# Patient Record
Sex: FEMALE | Race: WHITE | NOT HISPANIC OR LATINO | URBAN - METROPOLITAN AREA
[De-identification: names, ages, dates, MRNs, and addresses within clinical notes are randomized per-mention and may not be internally consistent; named-entity substitution may affect disease eponyms.]

---

## 2020-06-10 ENCOUNTER — INPATIENT (INPATIENT)
Facility: HOSPITAL | Age: 75
LOS: 0 days | Discharge: HOME HEALTH | End: 2020-06-11
Attending: SURGERY | Admitting: SURGERY
Payer: MEDICARE

## 2020-06-10 VITALS
HEART RATE: 104 BPM | DIASTOLIC BLOOD PRESSURE: 72 MMHG | HEIGHT: 64 IN | SYSTOLIC BLOOD PRESSURE: 160 MMHG | WEIGHT: 139.99 LBS | TEMPERATURE: 99 F | RESPIRATION RATE: 18 BRPM | OXYGEN SATURATION: 90 %
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
## 2020-06-10 LAB
ALBUMIN SERPL ELPH-MCNC: 4.1 G/DL — SIGNIFICANT CHANGE UP (ref 3.5–5.2)
ALP SERPL-CCNC: 94 U/L — SIGNIFICANT CHANGE UP (ref 30–115)
ALT FLD-CCNC: 38 U/L — SIGNIFICANT CHANGE UP (ref 0–41)
ANION GAP SERPL CALC-SCNC: 11 MMOL/L — SIGNIFICANT CHANGE UP (ref 7–14)
APPEARANCE UR: CLEAR — SIGNIFICANT CHANGE UP
APTT BLD: 26.6 SEC — LOW (ref 27–39.2)
AST SERPL-CCNC: 33 U/L — SIGNIFICANT CHANGE UP (ref 0–41)
BACTERIA # UR AUTO: ABNORMAL
BASOPHILS # BLD AUTO: 0.04 K/UL — SIGNIFICANT CHANGE UP (ref 0–0.2)
BASOPHILS NFR BLD AUTO: 0.2 % — SIGNIFICANT CHANGE UP (ref 0–1)
BILIRUB SERPL-MCNC: 0.8 MG/DL — SIGNIFICANT CHANGE UP (ref 0.2–1.2)
BILIRUB UR-MCNC: NEGATIVE — SIGNIFICANT CHANGE UP
BUN SERPL-MCNC: 11 MG/DL — SIGNIFICANT CHANGE UP (ref 10–20)
CALCIUM SERPL-MCNC: 9.2 MG/DL — SIGNIFICANT CHANGE UP (ref 8.5–10.1)
CHLORIDE SERPL-SCNC: 101 MMOL/L — SIGNIFICANT CHANGE UP (ref 98–110)
CK SERPL-CCNC: 509 U/L — HIGH (ref 0–225)
CO2 SERPL-SCNC: 25 MMOL/L — SIGNIFICANT CHANGE UP (ref 17–32)
COD CRY URNS QL: NEGATIVE — SIGNIFICANT CHANGE UP
COLOR SPEC: YELLOW — SIGNIFICANT CHANGE UP
CREAT SERPL-MCNC: 0.6 MG/DL — LOW (ref 0.7–1.5)
DIFF PNL FLD: NEGATIVE — SIGNIFICANT CHANGE UP
EOSINOPHIL # BLD AUTO: 0.1 K/UL — SIGNIFICANT CHANGE UP (ref 0–0.7)
EOSINOPHIL NFR BLD AUTO: 0.6 % — SIGNIFICANT CHANGE UP (ref 0–8)
EPI CELLS # UR: ABNORMAL /HPF
GLUCOSE SERPL-MCNC: 141 MG/DL — HIGH (ref 70–99)
GLUCOSE UR QL: NEGATIVE MG/DL — SIGNIFICANT CHANGE UP
GRAN CASTS # UR COMP ASSIST: NEGATIVE — SIGNIFICANT CHANGE UP
HCT VFR BLD CALC: 41 % — SIGNIFICANT CHANGE UP (ref 37–47)
HGB BLD-MCNC: 13.4 G/DL — SIGNIFICANT CHANGE UP (ref 12–16)
HYALINE CASTS # UR AUTO: NEGATIVE — SIGNIFICANT CHANGE UP
IMM GRANULOCYTES NFR BLD AUTO: 0.7 % — HIGH (ref 0.1–0.3)
INR BLD: 1.12 RATIO — SIGNIFICANT CHANGE UP (ref 0.65–1.3)
KETONES UR-MCNC: 40
LEUKOCYTE ESTERASE UR-ACNC: NEGATIVE — SIGNIFICANT CHANGE UP
LYMPHOCYTES # BLD AUTO: 0.98 K/UL — LOW (ref 1.2–3.4)
LYMPHOCYTES # BLD AUTO: 5.5 % — LOW (ref 20.5–51.1)
MAGNESIUM SERPL-MCNC: 2 MG/DL — SIGNIFICANT CHANGE UP (ref 1.8–2.4)
MCHC RBC-ENTMCNC: 29.3 PG — SIGNIFICANT CHANGE UP (ref 27–31)
MCHC RBC-ENTMCNC: 32.7 G/DL — SIGNIFICANT CHANGE UP (ref 32–37)
MCV RBC AUTO: 89.7 FL — SIGNIFICANT CHANGE UP (ref 81–99)
MONOCYTES # BLD AUTO: 0.95 K/UL — HIGH (ref 0.1–0.6)
MONOCYTES NFR BLD AUTO: 5.3 % — SIGNIFICANT CHANGE UP (ref 1.7–9.3)
NEUTROPHILS # BLD AUTO: 15.77 K/UL — HIGH (ref 1.4–6.5)
NEUTROPHILS NFR BLD AUTO: 87.7 % — HIGH (ref 42.2–75.2)
NITRITE UR-MCNC: NEGATIVE — SIGNIFICANT CHANGE UP
NRBC # BLD: 0 /100 WBCS — SIGNIFICANT CHANGE UP (ref 0–0)
PH UR: 6.5 — SIGNIFICANT CHANGE UP (ref 5–8)
PLATELET # BLD AUTO: 279 K/UL — SIGNIFICANT CHANGE UP (ref 130–400)
POTASSIUM SERPL-MCNC: 4.2 MMOL/L — SIGNIFICANT CHANGE UP (ref 3.5–5)
POTASSIUM SERPL-SCNC: 4.2 MMOL/L — SIGNIFICANT CHANGE UP (ref 3.5–5)
PROT SERPL-MCNC: 6.8 G/DL — SIGNIFICANT CHANGE UP (ref 6–8)
PROT UR-MCNC: 100 MG/DL
PROTHROM AB SERPL-ACNC: 12.9 SEC — HIGH (ref 9.95–12.87)
RBC # BLD: 4.57 M/UL — SIGNIFICANT CHANGE UP (ref 4.2–5.4)
RBC # FLD: 14.9 % — HIGH (ref 11.5–14.5)
RBC CASTS # UR COMP ASSIST: NEGATIVE — SIGNIFICANT CHANGE UP
SARS-COV-2 RNA SPEC QL NAA+PROBE: SIGNIFICANT CHANGE UP
SODIUM SERPL-SCNC: 137 MMOL/L — SIGNIFICANT CHANGE UP (ref 135–146)
SP GR SPEC: >=1.03 (ref 1.01–1.03)
TRI-PHOS CRY UR QL COMP ASSIST: NEGATIVE — SIGNIFICANT CHANGE UP
URATE CRY FLD QL MICRO: NEGATIVE — SIGNIFICANT CHANGE UP
UROBILINOGEN FLD QL: 0.2 MG/DL — SIGNIFICANT CHANGE UP (ref 0.2–0.2)
WBC # BLD: 17.96 K/UL — HIGH (ref 4.8–10.8)
WBC # FLD AUTO: 17.96 K/UL — HIGH (ref 4.8–10.8)
WBC UR QL: SIGNIFICANT CHANGE UP /HPF

## 2020-06-10 PROCEDURE — 73502 X-RAY EXAM HIP UNI 2-3 VIEWS: CPT | Mod: 26,RT

## 2020-06-10 PROCEDURE — 71260 CT THORAX DX C+: CPT | Mod: 26

## 2020-06-10 PROCEDURE — 71045 X-RAY EXAM CHEST 1 VIEW: CPT | Mod: 26

## 2020-06-10 PROCEDURE — 74177 CT ABD & PELVIS W/CONTRAST: CPT | Mod: 26

## 2020-06-10 PROCEDURE — 70450 CT HEAD/BRAIN W/O DYE: CPT | Mod: 26

## 2020-06-10 PROCEDURE — 99285 EMERGENCY DEPT VISIT HI MDM: CPT

## 2020-06-10 PROCEDURE — 72125 CT NECK SPINE W/O DYE: CPT | Mod: 26

## 2020-06-10 RX ORDER — OMEPRAZOLE 10 MG/1
0 CAPSULE, DELAYED RELEASE ORAL
Qty: 0 | Refills: 0 | DISCHARGE

## 2020-06-10 RX ORDER — IPRATROPIUM/ALBUTEROL SULFATE 18-103MCG
3 AEROSOL WITH ADAPTER (GRAM) INHALATION ONCE
Refills: 0 | Status: COMPLETED | OUTPATIENT
Start: 2020-06-10 | End: 2020-06-10

## 2020-06-10 RX ORDER — ACETAMINOPHEN 500 MG
650 TABLET ORAL ONCE
Refills: 0 | Status: COMPLETED | OUTPATIENT
Start: 2020-06-10 | End: 2020-06-10

## 2020-06-10 RX ORDER — BUDESONIDE AND FORMOTEROL FUMARATE DIHYDRATE 160; 4.5 UG/1; UG/1
2 AEROSOL RESPIRATORY (INHALATION)
Qty: 0 | Refills: 0 | DISCHARGE

## 2020-06-10 RX ADMIN — Medication 650 MILLIGRAM(S): at 19:36

## 2020-06-10 RX ADMIN — Medication 3 MILLILITER(S): at 15:57

## 2020-06-10 NOTE — ED ADULT TRIAGE NOTE - CHIEF COMPLAINT QUOTE
Per EMS, patient fell and hurt both legs near her hips last night. Pt got herself to a chair. Later that night she tried to go to the bathroom and could not walk so she stayed on the floor. Per EMS, patient was on the floor for 13-14 hours.

## 2020-06-10 NOTE — ED PROVIDER NOTE - OBJECTIVE STATEMENT
Patient BIBA, fell last night , Tripped over fan. Hitting right side in floor, Was able to get up at first.  Went to floor trying to go to the bathroom. Unable to get up and stayed in floor for approx 12-13 hours until daughter found her. NO LOC, C/o right rib and abdominal soreness, Pain right hip

## 2020-06-10 NOTE — ED PROVIDER NOTE - RESPIRATORY RHONCHI
Patient: Connor August    Procedure(s):  gastroscopy - Wound Class: II-Clean Contaminated    Diagnosis:gastroesophageal reflux, epigastric pain, dysphagia    diagnostic  Diagnosis Additional Information: No value filed.    Anesthesia Type:  No value filed.    Note:  Anesthesia Post Evaluation    Patient location during evaluation: Bedside  Patient participation: Able to fully participate in evaluation  Level of consciousness: awake and alert  Pain management: adequate  Airway patency: patent  Cardiovascular status: acceptable  Respiratory status: acceptable  Hydration status: acceptable  PONV: none     Anesthetic complications: None          Last vitals:  Vitals:    06/21/18 1247   BP: (!) 127/92   Resp: 16   Temp: 36.9  C (98.5  F)   SpO2: 98%         Electronically Signed By: SANTA Baer CRNA  June 21, 2018  1:46 PM  
DIFFUSE/right rib tenderness

## 2020-06-10 NOTE — H&P ADULT - HISTORY OF PRESENT ILLNESS
75F w/PMHx of COPD, asthma presents s/p mechanical fall at home, -HT, -LOC, -AC. She reports that she tripped over a small fan the night prior to presentation and was able to get back up and go to bed afterwards. The patient stood up and was able to go to bed. Later that night she got up to use the restroom, found standing 75F w/PMHx of COPD, asthma presents s/p mechanical fall at home, -HT, -LOC, -AC. She reports that she tripped over a small fan the night prior to presentation and was able to get back up and go to bed afterwards. The patient stood up and was able to go to bed. Later that night she got up to use the restroom, was too weak to walk, and crawled onto the floor unable to move. She remained there for the next 12 hours. Patient went to the [facility], found to have 75F w/PMHx of COPD, asthma presents s/p mechanical fall at home, -HT, -LOC, -AC. She reports that she tripped over a small fan the night prior to presentation and was able to get back up and go to bed afterwards. The patient stood up and was able to go to bed. Later that night she got up to use the restroom, was too weak to walk, and crawled onto the floor unable to move. She remained there for the next 12 hours. Patient went to the [facility], found to have right superior and inferior pubic rami fractures, sent to [facility]. She is mildly tachycardic in the ED (up to 104). Labs significant for leukocytosis, WBC 17.96. Hgb 13.4.

## 2020-06-10 NOTE — ED PROVIDER NOTE - ENMT, MLM
Airway patent, Nasal mucosa clear. Mouth with normal mucosa. Throat has no vesicles, no oropharyngeal exudates and uvula is midline. NC/AT

## 2020-06-10 NOTE — ED PROVIDER NOTE - PROGRESS NOTE DETAILS
lance - d/w trauma dr. castillo and crit care attending dr. case. Will transfer Macfarlan for trauma eval Spoke with orthopedics, will evaluate patient.

## 2020-06-10 NOTE — ED PROVIDER NOTE - ATTENDING CONTRIBUTION TO CARE
76yo F with COPD/astham, arthritis, GERD, presents for eval s/p mechanical fall. Pt states she tripped over a fan yesterday, fell on right side. Denies head trauma, LOC. Not on AC. Pt was able to get up initially and walked over to her recliner to sit down. Several hours later tried to get up to go to bathroom but was unable to walk 2/2 pain so she laid down on the floor, approx 12 hours until daughter found her. C/o right groin pain and right rib pain.     Vital Signs: I have reviewed the initial vital signs.  Constitutional: WDWN in nad.  HEAD: No signs of basilar skull fracture.  Integumentary: No rash. No lacerations, abrasions, ecchymoses or swelling.  EYES: No periorbial swelling/ecchymoses. PERRL, EOM intact.  ENT: MMM. No septal hematoma. No mastoid ecchymoses.  NECK: Supple, non-tender, no spinous tenderness to neck. No palpable shelves or step-offs.  BACK: No spinous tenderness. No palpable shelves or step-offs.  Cardiovascular: RRR, radial pulses 2/4 b/l. Mild TTP right lateral chest wall.  Respiratory: BS present b/l, ctabl, no wheezing or crackles, good air exchange, good resp effort and excursion, no accessory muscle use, no stridor.   Gastrointestinal: Soft, nd, nt no rebound tenderness or guarding, no cvat.  Musculoskeletal: Pain with ROM of right hip. Mild TTP in right inguinal region. No short leg. No internal or external rotation of LE. Brisk cap refill. Equal radial and pedal pulses  Neurologic: AAOx3. GCS 15. Speech clear and coherent. Answering questions appropriately. Face symmetric, no facial droop. Strength 5/5 x4, no drift. Normal finger-to-nose. No dysmetria. Ambulating normally, no gait abnormality. No gross FND.

## 2020-06-10 NOTE — ED ADULT NURSE REASSESSMENT NOTE - NS ED NURSE REASSESS COMMENT FT1
Pt is south transfer. Pt assessed. Pt is awake and alert, ax0,x4. Pt stated fell and trip over a fan. LOC-. Pt has jackson and IV inserted at south site. Pt is not in any distress. Pt sat 97% on 2LNC. Safety and comfort maintained. Will continue to monitor.

## 2020-06-10 NOTE — ED ADULT NURSE NOTE - INTERVENTIONS DEFINITIONS
Clune to call system/Call bell, personal items and telephone within reach/Stretcher in lowest position, wheels locked, appropriate side rails in place/Physically safe environment: no spills, clutter or unnecessary equipment

## 2020-06-10 NOTE — ED PROVIDER NOTE - CLINICAL SUMMARY MEDICAL DECISION MAKING FREE TEXT BOX
I personally evaluated the patient. I reviewed the Resident’s or Physician Assistant’s note (as assigned above), and agree with the findings and plan except as documented in my note. Patient admitted for fractures and inability to ambulate

## 2020-06-10 NOTE — H&P ADULT - NSHPPHYSICALEXAM_GEN_ALL_CORE
PHYSICAL EXAM:  GENERAL: NAD  CHEST/LUNG: Clear to auscultation bilaterally  HEART: Regular rate and rhythm  ABDOMEN: Soft, Nontender, Nondistended   EXTREMITIES:  No clubbing, cyanosis, or edema, ROM in tact in all extremities, strength 5/5 in all extremities, sensation in tact PHYSICAL EXAM:  GENERAL: NAD  CHEST/LUNG: Clear to auscultation bilaterally  HEART: Regular rate and rhythm  ABDOMEN: Soft, Nontender, Nondistended   EXTREMITIES:  No clubbing, cyanosis, or edema, ROM in tact in all extremities, strength 5/5 in all extremities, sensation in tact. Tender in the right shoulder PHYSICAL EXAM:  GENERAL: NAD  CHEST/LUNG: Clear to auscultation bilaterally  HEART: Regular rate and rhythm  ABDOMEN: Soft, Nontender, Nondistended   EXTREMITIES:  No clubbing, cyanosis, or edema, ROM intact in all extremities, strength 5/5 in all extremities, sensation intact. Tender in the right shoulder

## 2020-06-10 NOTE — ED PROVIDER NOTE - CARE PLAN
Principal Discharge DX:	Fall  Secondary Diagnosis:	Pelvic fracture  Secondary Diagnosis:	Rib fractures  Secondary Diagnosis:	COPD (chronic obstructive pulmonary disease)

## 2020-06-10 NOTE — H&P ADULT - ASSESSMENT
75F w/PMHx of COPD, asthma presents s/p mechanical fall at home, -HT, -LOC, -AC, with the following acute traumatic injuries:     1. Nondisplaced right superior and inferior rami fractures    Plan:   -Admission to trauma surgery service   -Adequate pain control  -Regular diet  -HSQ, PTX   -PT/Rehab 75F w/PMHx of COPD, asthma presents s/p mechanical fall at home, -HT, -LOC, -AC, with the following acute traumatic injuries:     1. Nondisplaced right superior and inferior rami fractures    Plan:   -Admission to trauma surgery service  -F/U orthopedic surgery recommendations   -F/U PT/Rehab recommendations   -Adequate pain control  -Regular diet  -HSQ, PTX   -PT/Rehab 75F w/PMHx of COPD, asthma presents s/p mechanical fall at home, -HT, -LOC, -AC, with the following acute traumatic injuries:     1. Nondisplaced right superior and inferior rami fractures    Plan:   -Admission to trauma surgery service  -F/U orthopedic surgery recommendations   -F/U PT/Rehab recommendations   -Adequate pain control  -Regular diet  -HSQ, PTX   -PT/Rehab    Consult Resident Addendum  75F s/p mechanical trip and fall on 6/9 AM with subsequent worsening pain and inability to walk leading to her being found down at home the following afternoon by landlord and family, initially presented to [facility] with panscan demonstrating nondisplaced right superior and inferior rami fractures. R clavicular tenderness on exam. Plan as above, pain control, regular diet, PT/rehab and f/u ortho recommendations. Plan discussed and agreed upon with Dr. Banegas, patient, ED, and trauma team. 75F w/PMHx of COPD, asthma presents s/p mechanical fall at home, -HT, -LOC, -AC, with the following acute traumatic injuries:     1. Nondisplaced right superior and inferior rami fractures    Plan:   -Admission to trauma surgery service  -F/U orthopedic surgery recommendations   -F/U PT/Rehab recommendations   -Adequate pain control  -Regular diet  -HSQ, PTX   -PT/Rehab    Consult Resident Addendum  75F s/p mechanical trip and fall on 6/9 PM with subsequent worsening pain and inability to walk leading to her being found down at home the following afternoon by landlord and family, initially presented to [facility] with panscan demonstrating nondisplaced right superior and inferior rami fractures. R clavicular tenderness on exam. Plan as above, pain control, regular diet, PT/rehab and f/u ortho recommendations. Plan discussed and agreed upon with Dr. Banegas, patient, ED, and trauma team.

## 2020-06-10 NOTE — H&P ADULT - NSHPLABSRESULTS_GEN_ALL_CORE
Labs:  POCT Blood Glucose.: 132 mg/dL (10 Atif 2020 22:33)  POCT Blood Glucose.: 146 mg/dL (10 Atif 2020 15:20)                          13.4   17.96 )-----------( 279      ( 10 Atif 2020 15:50 )             41.0       Auto Immature Granulocyte %: 0.7 % (06-10-20 @ 15:50)  Auto Neutrophil %: 87.7 % (06-10-20 @ 15:50)    06-10    137  |  101  |  11  ----------------------------<  141<H>  4.2   |  25  |  0.6<L>      Calcium, Total Serum: 9.2 mg/dL (06-10-20 @ 15:50)    LFTs:             6.8  | 0.8  | 33       ------------------[94      ( 10 Atif 2020 15:50 )  4.1  | x    | 38            Coags:     12.90  ----< 1.12    ( 10 Atif 2020 15:50 )     26.6        CARDIAC MARKERS ( 10 Atif 2020 15:50 )  x     / x     / 509 U/L / x     / x        Urinalysis Basic - ( 10 Atif 2020 15:49 )    Color: Yellow / Appearance: Clear / SG: >=1.030 / pH: x  Gluc: x / Ketone: 40  / Bili: Negative / Urobili: 0.2 mg/dL   Blood: x / Protein: 100 mg/dL / Nitrite: Negative   Leuk Esterase: Negative / RBC: Negative / WBC 3-5 /HPF   Sq Epi: x / Non Sq Epi: Occasional /HPF / Bacteria: Few    RADIOLOGY:   < from: CT Abdomen and Pelvis w/ IV Cont (06.10.20 @ 18:15) >  IMPRESSION:   1.  Nondisplaced right superior and inferior rami fractures  2.  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  3.  Large hiatal hernia.  4.  Colonic diverticulosis without evidence of diverticulitis.    < from: CT Chest w/ IV Cont (06.10.20 @ 18:15) >  IMPRESSION:   1.  Nondisplaced right superior and inferior rami fractures  2.  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  3.  Large hiatal hernia.  4.  Colonic diverticulosis without evidence of diverticulitis.    < from: CT Cervical Spine No Cont (06.10.20 @ 18:15) >  IMPRESSION:  Degenerative changes as described above. No evidence of fracture or facet subluxation.    < from: CT Head No Cont (06.10.20 @ 18:15) >  IMPRESSION:  No CT evidence of acute intracranial pathology.    < from: Xray Hip 2-3 Views, Right (06.10.20 @ 15:50) >  IMPRESSION: Osteopenia without acutely displaced fracture    < from: Xray Pelvis AP only (06.10.20 @ 15:50) >  IMPRESSION: Osteopenia without acutely displaced fracture

## 2020-06-11 ENCOUNTER — TRANSCRIPTION ENCOUNTER (OUTPATIENT)
Age: 75
End: 2020-06-11

## 2020-06-11 VITALS
DIASTOLIC BLOOD PRESSURE: 61 MMHG | RESPIRATION RATE: 18 BRPM | HEART RATE: 95 BPM | SYSTOLIC BLOOD PRESSURE: 103 MMHG | TEMPERATURE: 97 F

## 2020-06-11 LAB
ANION GAP SERPL CALC-SCNC: 13 MMOL/L — SIGNIFICANT CHANGE UP (ref 7–14)
BUN SERPL-MCNC: 11 MG/DL — SIGNIFICANT CHANGE UP (ref 10–20)
CALCIUM SERPL-MCNC: 8.6 MG/DL — SIGNIFICANT CHANGE UP (ref 8.5–10.1)
CHLORIDE SERPL-SCNC: 101 MMOL/L — SIGNIFICANT CHANGE UP (ref 98–110)
CK SERPL-CCNC: 561 U/L — HIGH (ref 0–225)
CO2 SERPL-SCNC: 23 MMOL/L — SIGNIFICANT CHANGE UP (ref 17–32)
CREAT SERPL-MCNC: 0.5 MG/DL — LOW (ref 0.7–1.5)
GLUCOSE BLDC GLUCOMTR-MCNC: 118 MG/DL — HIGH (ref 70–99)
GLUCOSE SERPL-MCNC: 126 MG/DL — HIGH (ref 70–99)
HCT VFR BLD CALC: 37.8 % — SIGNIFICANT CHANGE UP (ref 37–47)
HCV AB S/CO SERPL IA: 0.04 COI — SIGNIFICANT CHANGE UP
HCV AB SERPL-IMP: SIGNIFICANT CHANGE UP
HGB BLD-MCNC: 12.2 G/DL — SIGNIFICANT CHANGE UP (ref 12–16)
MAGNESIUM SERPL-MCNC: 1.9 MG/DL — SIGNIFICANT CHANGE UP (ref 1.8–2.4)
MCHC RBC-ENTMCNC: 29.1 PG — SIGNIFICANT CHANGE UP (ref 27–31)
MCHC RBC-ENTMCNC: 32.3 G/DL — SIGNIFICANT CHANGE UP (ref 32–37)
MCV RBC AUTO: 90.2 FL — SIGNIFICANT CHANGE UP (ref 81–99)
NRBC # BLD: 0 /100 WBCS — SIGNIFICANT CHANGE UP (ref 0–0)
PHOSPHATE SERPL-MCNC: 2.2 MG/DL — SIGNIFICANT CHANGE UP (ref 2.1–4.9)
PLATELET # BLD AUTO: 207 K/UL — SIGNIFICANT CHANGE UP (ref 130–400)
POTASSIUM SERPL-MCNC: 4.4 MMOL/L — SIGNIFICANT CHANGE UP (ref 3.5–5)
POTASSIUM SERPL-SCNC: 4.4 MMOL/L — SIGNIFICANT CHANGE UP (ref 3.5–5)
RBC # BLD: 4.19 M/UL — LOW (ref 4.2–5.4)
RBC # FLD: 15.2 % — HIGH (ref 11.5–14.5)
SODIUM SERPL-SCNC: 137 MMOL/L — SIGNIFICANT CHANGE UP (ref 135–146)
WBC # BLD: 14.88 K/UL — HIGH (ref 4.8–10.8)
WBC # FLD AUTO: 14.88 K/UL — HIGH (ref 4.8–10.8)

## 2020-06-11 PROCEDURE — 73000 X-RAY EXAM OF COLLAR BONE: CPT | Mod: 26,RT

## 2020-06-11 PROCEDURE — 99223 1ST HOSP IP/OBS HIGH 75: CPT

## 2020-06-11 RX ORDER — ONDANSETRON 8 MG/1
4 TABLET, FILM COATED ORAL EVERY 6 HOURS
Refills: 0 | Status: DISCONTINUED | OUTPATIENT
Start: 2020-06-11 | End: 2020-06-11

## 2020-06-11 RX ORDER — BUDESONIDE AND FORMOTEROL FUMARATE DIHYDRATE 160; 4.5 UG/1; UG/1
2 AEROSOL RESPIRATORY (INHALATION)
Refills: 0 | Status: DISCONTINUED | OUTPATIENT
Start: 2020-06-11 | End: 2020-06-11

## 2020-06-11 RX ORDER — SODIUM CHLORIDE 9 MG/ML
1000 INJECTION INTRAMUSCULAR; INTRAVENOUS; SUBCUTANEOUS
Refills: 0 | Status: DISCONTINUED | OUTPATIENT
Start: 2020-06-11 | End: 2020-06-11

## 2020-06-11 RX ORDER — ACETAMINOPHEN 500 MG
650 TABLET ORAL EVERY 6 HOURS
Refills: 0 | Status: DISCONTINUED | OUTPATIENT
Start: 2020-06-11 | End: 2020-06-11

## 2020-06-11 RX ORDER — HEPARIN SODIUM 5000 [USP'U]/ML
5000 INJECTION INTRAVENOUS; SUBCUTANEOUS EVERY 8 HOURS
Refills: 0 | Status: DISCONTINUED | OUTPATIENT
Start: 2020-06-11 | End: 2020-06-11

## 2020-06-11 RX ORDER — OXYCODONE HYDROCHLORIDE 5 MG/1
5 TABLET ORAL EVERY 6 HOURS
Refills: 0 | Status: DISCONTINUED | OUTPATIENT
Start: 2020-06-11 | End: 2020-06-11

## 2020-06-11 RX ORDER — CHLORHEXIDINE GLUCONATE 213 G/1000ML
1 SOLUTION TOPICAL
Refills: 0 | Status: DISCONTINUED | OUTPATIENT
Start: 2020-06-11 | End: 2020-06-11

## 2020-06-11 RX ORDER — OXYCODONE HYDROCHLORIDE 5 MG/1
1 TABLET ORAL
Qty: 8 | Refills: 0
Start: 2020-06-11 | End: 2020-06-12

## 2020-06-11 RX ORDER — ACETAMINOPHEN 500 MG
2 TABLET ORAL
Qty: 0 | Refills: 0 | DISCHARGE
Start: 2020-06-11

## 2020-06-11 RX ORDER — PANTOPRAZOLE SODIUM 20 MG/1
40 TABLET, DELAYED RELEASE ORAL
Refills: 0 | Status: DISCONTINUED | OUTPATIENT
Start: 2020-06-11 | End: 2020-06-11

## 2020-06-11 RX ORDER — SENNA PLUS 8.6 MG/1
2 TABLET ORAL AT BEDTIME
Refills: 0 | Status: DISCONTINUED | OUTPATIENT
Start: 2020-06-11 | End: 2020-06-11

## 2020-06-11 RX ADMIN — Medication 650 MILLIGRAM(S): at 11:15

## 2020-06-11 RX ADMIN — CHLORHEXIDINE GLUCONATE 1 APPLICATION(S): 213 SOLUTION TOPICAL at 07:56

## 2020-06-11 RX ADMIN — PANTOPRAZOLE SODIUM 40 MILLIGRAM(S): 20 TABLET, DELAYED RELEASE ORAL at 05:01

## 2020-06-11 RX ADMIN — HEPARIN SODIUM 5000 UNIT(S): 5000 INJECTION INTRAVENOUS; SUBCUTANEOUS at 05:00

## 2020-06-11 RX ADMIN — HEPARIN SODIUM 5000 UNIT(S): 5000 INJECTION INTRAVENOUS; SUBCUTANEOUS at 13:28

## 2020-06-11 RX ADMIN — Medication 650 MILLIGRAM(S): at 05:01

## 2020-06-11 RX ADMIN — BUDESONIDE AND FORMOTEROL FUMARATE DIHYDRATE 2 PUFF(S): 160; 4.5 AEROSOL RESPIRATORY (INHALATION) at 08:13

## 2020-06-11 NOTE — PHYSICAL THERAPY INITIAL EVALUATION ADULT - GENERAL OBSERVATIONS, REHAB EVAL
Pt encountered in the bed, +jackson, agreeable for b/s PT fairly to tx. Pt c/o 8/10 pain in R groin JERRICA Mcfarlane made aware. Pt left in the b/s chair post tx +alarm, all needs within reach.

## 2020-06-11 NOTE — PROGRESS NOTE ADULT - SUBJECTIVE AND OBJECTIVE BOX
GENERAL SURGERY PROGRESS NOTE     MARCEL GARRETT  75y  Female    OVERNIGHT EVENTS: no acute events overnight     T(F): 97 (06-11-20 @ 07:43), Max: 99.4 (06-10-20 @ 15:15)  HR: 86 (06-11-20 @ 07:43) (79 - 104)  BP: 123/58 (06-11-20 @ 07:43) (109/58 - 160/72)  RR: 18 (06-11-20 @ 07:43) (18 - 21)  SpO2: 99% (06-11-20 @ 01:30) (90% - 99%)    URINE:  Indwelling Urethral Catheter:     Connect To:  Straight Drainage/Gravity    Indication:  Traumatic Injury requiring immobilization (06-10-20 @ 17:37)    GI proph:  pantoprazole    Tablet 40 milliGRAM(s) Oral before breakfast    AC/ proph: heparin   Injectable 5000 Unit(s) SubCutaneous every 8 hours    ABx:     PHYSICAL EXAM:  GENERAL: NAD, well-appearing  CHEST/LUNG: Clear to auscultation bilaterally  HEART: Regular rate and rhythm  ABDOMEN: Soft, Nontender, Nondistended;       LABS  Labs:  CAPILLARY BLOOD GLUCOSE      POCT Blood Glucose.: 118 mg/dL (11 Jun 2020 08:22)  POCT Blood Glucose.: 132 mg/dL (10 Atif 2020 22:33)  POCT Blood Glucose.: 146 mg/dL (10 Atif 2020 15:20)                          12.2   14.88 )-----------( 207      ( 11 Jun 2020 05:44 )             37.8       Auto Immature Granulocyte %: 0.7 % (06-10-20 @ 15:50)  Auto Neutrophil %: 87.7 % (06-10-20 @ 15:50)    06-11    137  |  101  |  11  ----------------------------<  126<H>  4.4   |  23  |  0.5<L>      Calcium, Total Serum: 8.6 mg/dL (06-11-20 @ 05:44)      LFTs:             6.8  | 0.8  | 33       ------------------[94      ( 10 Atif 2020 15:50 )  4.1  | x    | 38          Lipase:x      Amylase:x             Coags:     12.90  ----< 1.12    ( 10 Atif 2020 15:50 )     26.6        CARDIAC MARKERS ( 11 Jun 2020 05:44 )  x     / x     / 561 U/L / x     / x      CARDIAC MARKERS ( 10 Atif 2020 15:50 )  x     / x     / 509 U/L / x     / x              Urinalysis Basic - ( 10 Atif 2020 15:49 )    Color: Yellow / Appearance: Clear / SG: >=1.030 / pH: x  Gluc: x / Ketone: 40  / Bili: Negative / Urobili: 0.2 mg/dL   Blood: x / Protein: 100 mg/dL / Nitrite: Negative   Leuk Esterase: Negative / RBC: Negative / WBC 3-5 /HPF   Sq Epi: x / Non Sq Epi: Occasional /HPF / Bacteria: Few            RADIOLOGY & ADDITIONAL TESTS:      A/P

## 2020-06-11 NOTE — DISCHARGE NOTE PROVIDER - NSDCMRMEDTOKEN_GEN_ALL_CORE_FT
acetaminophen 325 mg oral tablet: 2 tab(s) orally every 6 hours  omeprazole:   oxyCODONE 5 mg oral tablet: 1 tab(s) orally every 6 hours, As needed, Severe Pain (7 - 10) MDD:4 tabs  Symbicort 80 mcg-4.5 mcg/inh inhalation aerosol: 2 puff(s) inhaled 2 times a day

## 2020-06-11 NOTE — OCCUPATIONAL THERAPY INITIAL EVALUATION ADULT - PLANNED THERAPY INTERVENTIONS, OT EVAL
balance training/transfer training/ADL retraining/bed mobility training/ROM/strengthening/stretching

## 2020-06-11 NOTE — PHYSICAL THERAPY INITIAL EVALUATION ADULT - LIVES WITH, PROFILE
pt lives a apt in a 3 family home with 4 steps to enter; however, Pt reported she is going to live with her daughter upon d/c where she has 1 step to enter.

## 2020-06-11 NOTE — DISCHARGE NOTE PROVIDER - NSFOLLOWUPCLINICS_GEN_ALL_ED_FT
[facility]  Trauma Surgery  [address] [place] [address]  Phone: [phone]  Fax:   Follow Up Time:

## 2020-06-11 NOTE — DISCHARGE NOTE NURSING/CASE MANAGEMENT/SOCIAL WORK - PATIENT PORTAL LINK FT
You can access the FollowMyHealth Patient Portal offered by [facility] by registering at the following website: [email or web address]. By joining [employer]’s FollowMyHealth portal, you will also be able to view your health information using other applications (apps) compatible with our system.

## 2020-06-11 NOTE — OCCUPATIONAL THERAPY INITIAL EVALUATION ADULT - SHORT TERM MEMORY, REHAB EVAL
Ok to remove gauze, shower, and drive tomorrow.  Ice x2 days. Scrotal support x5 days. No strenuous activity x2 weeks. Follow up with Dr. Avila in 2 weeks.  
intact

## 2020-06-11 NOTE — DISCHARGE NOTE PROVIDER - CARE PROVIDER_API CALL
Suresh Mojica  ORTHOPAEDIC SURGERY  [address] [place] [address]  Phone: [phone]  Fax: [phone]  Follow Up Time: 2 weeks

## 2020-06-11 NOTE — DISCHARGE NOTE PROVIDER - NSDCCPCAREPLAN_GEN_ALL_CORE_FT
PRINCIPAL DISCHARGE DIAGNOSIS  Diagnosis: Fall  Assessment and Plan of Treatment: Pain medication prescribed to [facility], please take as prescribed  Weight bearing as tolerated  Home PT with rolling walker        SECONDARY DISCHARGE DIAGNOSES  Diagnosis: COPD (chronic obstructive pulmonary disease)  Assessment and Plan of Treatment:     Diagnosis: Rib fractures  Assessment and Plan of Treatment:     Diagnosis: Pelvic fracture  Assessment and Plan of Treatment:

## 2020-06-11 NOTE — PHYSICAL THERAPY INITIAL EVALUATION ADULT - PERTINENT HX OF CURRENT PROBLEM, REHAB EVAL
75F w/PMHx of COPD, asthma presents s/p mechanical fall at home, -HT, -LOC, -AC, with Nondisplaced right superior and inferior rami fractures

## 2020-06-11 NOTE — DISCHARGE NOTE PROVIDER - HOSPITAL COURSE
75F s/p fall sustaining right superior and inferior pubic rami fractures, seen by ortho who recommended weight bearing as tolerated otherwise no surgical intervention. Patient was seen by PT and rehab, walked with walker. Patient is tolerating diet, ambulating, ready for discharge with home PT. 75F s/p fall sustaining right superior and inferior pubic rami fractures, seen by ortho who recommended weight bearing as tolerated otherwise no surgical intervention. Patient was seen by PT and rehab, walked with walker. Patient is tolerating diet, ambulating, voiding spontaneously, ready for discharge with home PT.

## 2020-06-11 NOTE — CONSULT NOTE ADULT - ASSESSMENT
IMPRESSION: Rehab of right pelvic fx / right shoulder sprain    PRECAUTIONS: [  ] Cardiac  [  ] Respiratory  [  ] Seizures [  ] Contact Isolation  [  ] Droplet Isolation  [  ] Other    Weight Bearing Status: wbat    RECOMMENDATION:    Out of Bed to Chair     DVT/Decubiti Prophylaxis    REHAB PLAN:     [ x  ] Bedside P/T 3-5 times a week   [ x  ]   Bedside O/T  2-3 times a week             [   ] No Rehab Therapy Indicated                   [   ]  Speech Therapy   Conditioning/ROM                                    ADL  Bed Mobility                                               Conditioning/ROM  Transfers                                                     Bed Mobility  Sitting /Standing Balance                         Transfers                                        Gait Training                                               Sitting/Standing Balance  Stair Training [   ]Applicable                    Home equipment Eval                                                                        Splinting  [   ] Only      GOALS:   ADL   [  x ]   Independent                    Transfers  [ x  ] Independent                          Ambulation  [x   ] Independent     [ x   ] With device                            [   ]  CG                                                         [   ]  CG                                                                  [   ] CG                            [    ] Min A                                                   [   ] Min A                                                              [   ] Min  A          DISCHARGE PLAN:   [   ]  Good candidate for Intensive Rehabilitation/Hospital based                                             Will tolerate 3hrs Intensive Rehab Daily                                       [  x  ]  Short Term Rehab in Skilled Nursing Facility                         vs              [  x  ]  Home with Outpatient or  services                                         [    ]  Possible Candidate for Intensive Hospital based Rehab

## 2020-06-11 NOTE — OCCUPATIONAL THERAPY INITIAL EVALUATION ADULT - TRANSFER SAFETY CONCERNS NOTED: BED/CHAIR, REHAB EVAL
stand pivot/stepping too close to front of assistive device/decreased weight-shifting ability/losing balance

## 2020-06-11 NOTE — CONSULT NOTE ADULT - SUBJECTIVE AND OBJECTIVE BOX
75F w/PMHx of COPD, asthma presents s/p mechanical fall at home with right groin pain. Patient denied head trauma, LOC. She reports that she tripped over a small fan the night prior to presentation and was able to get back up and go to bed afterwards. Patient able to ambulate post fall but continued to have pain. Later that night she got up to use the restroom, was too weak to walk, and crawled onto the floor unable to move. She remained there for the next 12 hours. Patient went to the [facility], found to have right superior and inferior pubic rami fractures, sent to [facility]. At baseline, patient is ambulatory without assistive devices.     PAST MEDICAL & SURGICAL HISTORY:  Acid reflux  Asthma  COPD (chronic obstructive pulmonary disease)  No significant past surgical history    Home Medications:  omeprazole:  (10 Atif 2020 15:26)  Symbicort 80 mcg-4.5 mcg/inh inhalation aerosol: 2 puff(s) inhaled 2 times a day (10 Atif 2020 15:26)      Allergies    No Known Allergies    Intolerances    Physical Exam  Vital Signs Last 24 Hrs  T(C): 36.1 (11 Jun 2020 07:43), Max: 37.4 (10 Atif 2020 15:15)  T(F): 97 (11 Jun 2020 07:43), Max: 99.4 (10 Atif 2020 15:15)  HR: 86 (11 Jun 2020 07:43) (79 - 104)  BP: 123/58 (11 Jun 2020 07:43) (109/58 - 160/72)  BP(mean): --  RR: 18 (11 Jun 2020 07:43) (18 - 21)  SpO2: 99% (11 Jun 2020 01:30) (90% - 99%)    NAD, AOx3  Non-labored breathing  Bilateral UE exam benign  Skin intact, no swelling, ecchymosis appreciated  FAROM at shoulder, elbow, wrist  NTTP throughout bilateral UE  LLE  Exam benign  FAROM, NTTP throughout extremity  RLE  NTTP at knee, ankle  FAROM at knee, ankle  Groin pain with axial loading  Log roll negative  EHL/FHL/GA/TA Motor intact  SP/DP/T/S/S SILT distally  Toes wwp                          12.2   14.88 )-----------( 207      ( 11 Jun 2020 05:44 )             37.8     06-11    137  |  101  |  11  ----------------------------<  126<H>  4.4   |  23  |  0.5<L>    Ca    8.6      11 Jun 2020 05:44  Phos  2.2     06-11  Mg     1.9     06-11    TPro  6.8  /  Alb  4.1  /  TBili  0.8  /  DBili  x   /  AST  33  /  ALT  38  /  AlkPhos  94  06-10    Assessment/plan  75F with right, non-displaced superior/inferior rami fracture    WBAT  Pain control  PT/OT  DVT ppx  OOBTC/IS

## 2020-06-11 NOTE — CONSULT NOTE ADULT - SUBJECTIVE AND OBJECTIVE BOX
HPI:  75F w/PMHx of COPD, asthma presents s/p mechanical fall at home, -HT, -LOC, -AC. She reports that she tripped over a small fan the night prior to presentation and was able to get back up and go to bed afterwards. The patient stood up and was able to go to bed. Later that night she got up to use the restroom, was too weak to walk, and crawled onto the floor unable to move. She remained there for the next 12 hours. Patient went to the [facility], found to have right superior and inferior pubic rami fractures, sent to [facility]. She is mildly tachycardic in the ED (up to 104). Labs significant for leukocytosis, WBC 17.96. Hgb 13.4. (10 Atif 2020 23:09)      PAST MEDICAL & SURGICAL HISTORY:  Acid reflux  Asthma  COPD (chronic obstructive pulmonary disease)  No significant past surgical history      Hospital Course:    TODAY'S SUBJECTIVE & REVIEW OF SYMPTOMS:     Constitutional WNL   Cardio WNL   Resp WNL   GI WNL  Heme WNL  Endo WNL  Skin WNL  MSK pain  Neuro WNL  Cognitive WNL  Psych WNL      MEDICATIONS  (STANDING):  acetaminophen   Tablet .. 650 milliGRAM(s) Oral every 6 hours  budesonide  80 MICROgram(s)/formoterol 4.5 MICROgram(s) Inhaler 2 Puff(s) Inhalation two times a day  chlorhexidine 4% Liquid 1 Application(s) Topical <User Schedule>  heparin   Injectable 5000 Unit(s) SubCutaneous every 8 hours  pantoprazole    Tablet 40 milliGRAM(s) Oral before breakfast  senna 2 Tablet(s) Oral at bedtime    MEDICATIONS  (PRN):  ondansetron Injectable 4 milliGRAM(s) IV Push every 6 hours PRN Nausea and/or Vomiting  oxyCODONE    IR 5 milliGRAM(s) Oral every 6 hours PRN Severe Pain (7 - 10)      FAMILY HISTORY:      Allergies    No Known Allergies    Intolerances        SOCIAL HISTORY:    [  ] Etoh  [  ] Smoking  [  ] Substance abuse     Home Environment:  [ x ] Home Alone  [  ] Lives with Family  [  ] Home Health Aid    Dwelling:  [  ] Apartment  [x  ] Private House  [  ] Adult Home  [  ] Skilled Nursing Facility      [  ] Short Term  [  ] Long Term  [x  ] Stairs       Elevator [  ]    FUNCTIONAL STATUS PTA: (Check all that apply)  Ambulation: [x   ]Independent    [  ] Dependent     [  ] Non-Ambulatory  Assistive Device: [  ] SA Cane  [  ]  Q Cane  [  ] Walker  [  ]  Wheelchair  ADL : [ x ] Independent  [  ]  Dependent       Vital Signs Last 24 Hrs  T(C): 36.1 (11 Jun 2020 07:43), Max: 37.4 (10 Atif 2020 15:15)  T(F): 97 (11 Jun 2020 07:43), Max: 99.4 (10 Atif 2020 15:15)  HR: 86 (11 Jun 2020 07:43) (79 - 104)  BP: 123/58 (11 Jun 2020 07:43) (109/58 - 160/72)  BP(mean): --  RR: 18 (11 Jun 2020 07:43) (18 - 21)  SpO2: 99% (11 Jun 2020 10:09) (90% - 99%)      PHYSICAL EXAM: Alert & Oriented X3  GENERAL: NAD, well-groomed, well-developed  HEAD:  Atraumatic, Normocephalic  CHEST/LUNG: Clear   HEART: S1S2+  ABDOMEN: Soft, Nontender  EXTREMITIES:  no calf tenderness, + tenderness right shoulder    NERVOUS SYSTEM:  Cranial Nerves 2-12 intact [  ] Abnormal  [  ]  ROM: WFL all extremities [  ]  Abnormal [ x ]limited rle  Motor Strength: WFL all extremities  [  ]  Abnormal [x  ]limited rle  Sensation: intact to light touch [ x ] Abnormal [  ]  Reflexes: Symmetric [  ]  Abnormal [  ]    FUNCTIONAL STATUS:  Bed Mobility: Independent [  ]  Supervision [  ]  Needs Assistance [x  ]  N/A [  ]  Transfers: Independent [  ]  Supervision [  ]  Needs Assistance [ x ]  N/A [  ]   Ambulation: Independent [  ]  Supervision [  ]  Needs Assistance [  ]  N/A [  ]  ADL: Independent [  ] Requires Assistance [  ] N/A [  ]      LABS:                        12.2   14.88 )-----------( 207      ( 11 Jun 2020 05:44 )             37.8     06-11    137  |  101  |  11  ----------------------------<  126<H>  4.4   |  23  |  0.5<L>    Ca    8.6      11 Jun 2020 05:44  Phos  2.2     06-11  Mg     1.9     06-11    TPro  6.8  /  Alb  4.1  /  TBili  0.8  /  DBili  x   /  AST  33  /  ALT  38  /  AlkPhos  94  06-10    PT/INR - ( 10 Atif 2020 15:50 )   PT: 12.90 sec;   INR: 1.12 ratio         PTT - ( 10 Atif 2020 15:50 )  PTT:26.6 sec  Urinalysis Basic - ( 10 Atif 2020 15:49 )    Color: Yellow / Appearance: Clear / SG: >=1.030 / pH: x  Gluc: x / Ketone: 40  / Bili: Negative / Urobili: 0.2 mg/dL   Blood: x / Protein: 100 mg/dL / Nitrite: Negative   Leuk Esterase: Negative / RBC: Negative / WBC 3-5 /HPF   Sq Epi: x / Non Sq Epi: Occasional /HPF / Bacteria: Few        RADIOLOGY & ADDITIONAL STUDIES:    Assesment:

## 2020-06-11 NOTE — PROGRESS NOTE ADULT - ASSESSMENT
75F w/PMHx of COPD, asthma presents s/p mechanical fall at home, -HT, -LOC, -AC, with the following acute traumatic injuries:     1. Nondisplaced right superior and inferior rami fractures     -> 560    Plan:   -ortho WBAT  -RD, IVL  -PT, rehab  -dispo planning

## 2020-06-15 DIAGNOSIS — S32.9XXA FRACTURE OF UNSPECIFIED PARTS OF LUMBOSACRAL SPINE AND PELVIS, INITIAL ENCOUNTER FOR CLOSED FRACTURE: ICD-10-CM

## 2020-06-15 DIAGNOSIS — K21.9 GASTRO-ESOPHAGEAL REFLUX DISEASE WITHOUT ESOPHAGITIS: ICD-10-CM

## 2020-06-15 DIAGNOSIS — K57.90 DIVERTICULOSIS OF INTESTINE, PART UNSPECIFIED, WITHOUT PERFORATION OR ABSCESS WITHOUT BLEEDING: ICD-10-CM

## 2020-06-15 DIAGNOSIS — M85.80 OTHER SPECIFIED DISORDERS OF BONE DENSITY AND STRUCTURE, UNSPECIFIED SITE: ICD-10-CM

## 2020-06-15 DIAGNOSIS — J45.909 UNSPECIFIED ASTHMA, UNCOMPLICATED: ICD-10-CM

## 2020-06-15 DIAGNOSIS — K44.9 DIAPHRAGMATIC HERNIA WITHOUT OBSTRUCTION OR GANGRENE: ICD-10-CM

## 2020-06-15 DIAGNOSIS — Z87.891 PERSONAL HISTORY OF NICOTINE DEPENDENCE: ICD-10-CM

## 2020-06-15 DIAGNOSIS — S32.511A FRACTURE OF SUPERIOR RIM OF RIGHT PUBIS, INITIAL ENCOUNTER FOR CLOSED FRACTURE: ICD-10-CM

## 2020-06-15 DIAGNOSIS — J44.9 CHRONIC OBSTRUCTIVE PULMONARY DISEASE, UNSPECIFIED: ICD-10-CM
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
## 2020-06-15 DIAGNOSIS — S32.591A OTHER SPECIFIED FRACTURE OF RIGHT PUBIS, INITIAL ENCOUNTER FOR CLOSED FRACTURE: ICD-10-CM
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
## 2020-06-15 DIAGNOSIS — S22.41XA MULTIPLE FRACTURES OF RIBS, RIGHT SIDE, INITIAL ENCOUNTER FOR CLOSED FRACTURE: ICD-10-CM

## 2020-06-15 DIAGNOSIS — I77.811 ABDOMINAL AORTIC ECTASIA: ICD-10-CM

## 2020-06-16 LAB
CULTURE RESULTS: SIGNIFICANT CHANGE UP
SPECIMEN SOURCE: SIGNIFICANT CHANGE UP

## 2021-04-27 NOTE — H&P ADULT - NSHPSOURCEINFORD_GEN_ALL_CORE
Chart(s)/Patient
PAST SURGICAL HISTORY:  H/O of laryngoplasty 11/8/2019, excision of Laryngeal tracheo stenosis    History of adenoidectomy 2011    History of ear, nose, and throat (ENT) surgery S/p DLB and excision of laryngeal stenosis: 2011    S/P PDA repair S/p PDA ligation in NICU

## 2022-09-01 NOTE — ED PROVIDER NOTE - NSRISKSEXPLAINEDTO_ED_A_ED
Quality 130: Documentation Of Current Medications In The Medical Record: Current Medications Documented
Quality 154 Part B: Falls: Risk Screening (Should Be Reported With Measure 155.): Patient screened for future fall risk; documentation of no falls in the past year or only one fall without injury in the past year
Quality 431: Preventive Care And Screening: Unhealthy Alcohol Use - Screening: Patient not identified as an unhealthy alcohol user when screened for unhealthy alcohol use using a systematic screening method
Quality 110: Preventive Care And Screening: Influenza Immunization: Influenza Immunization previously received during influenza season
Quality 47: Advance Care Plan: Advance Care Planning discussed and documented in the medical record; patient did not wish or was not able to name a surrogate decision maker or provide an advance care plan.
Detail Level: Detailed
Quality 226: Preventive Care And Screening: Tobacco Use: Screening And Cessation Intervention: Patient screened for tobacco use and is an ex/non-smoker
Quality 154 Part A: Falls: Risk Assessment (Should Be Reported With Measure 155.): Falls risk assessment completed and documented in the past 12 months.
Quality 155 (Denominator): Falls Plan Of Care: Plan of Care not Documented, Reason not Otherwise Specified
Quality 111:Pneumonia Vaccination Status For Older Adults: Pneumococcal vaccine administered on or after patient’s 60th birthday and before the end of the measurement period
Patient

## 2022-09-29 ENCOUNTER — APPOINTMENT (OUTPATIENT)
Dept: PAIN MANAGEMENT | Facility: CLINIC | Age: 77
End: 2022-09-29

## 2022-09-29 VITALS
WEIGHT: 144 LBS | BODY MASS INDEX: 24.59 KG/M2 | DIASTOLIC BLOOD PRESSURE: 77 MMHG | HEIGHT: 64 IN | HEART RATE: 93 BPM | SYSTOLIC BLOOD PRESSURE: 131 MMHG

## 2022-09-29 PROBLEM — J45.909 UNSPECIFIED ASTHMA, UNCOMPLICATED: Chronic | Status: ACTIVE | Noted: 2020-06-10

## 2022-09-29 PROBLEM — Z00.00 ENCOUNTER FOR PREVENTIVE HEALTH EXAMINATION: Status: ACTIVE | Noted: 2022-09-29

## 2022-09-29 PROBLEM — K21.9 GASTRO-ESOPHAGEAL REFLUX DISEASE WITHOUT ESOPHAGITIS: Chronic | Status: ACTIVE | Noted: 2020-06-10

## 2022-09-29 PROBLEM — J44.9 CHRONIC OBSTRUCTIVE PULMONARY DISEASE, UNSPECIFIED: Chronic | Status: ACTIVE | Noted: 2020-06-10

## 2022-09-29 PROCEDURE — 99204 OFFICE O/P NEW MOD 45 MIN: CPT

## 2022-09-29 RX ORDER — TRAMADOL HYDROCHLORIDE 50 MG/1
50 TABLET, COATED ORAL 3 TIMES DAILY
Qty: 42 | Refills: 0 | Status: ACTIVE | COMMUNITY
Start: 2022-09-29 | End: 1900-01-01

## 2022-09-29 RX ORDER — MELOXICAM 15 MG/1
15 TABLET ORAL DAILY
Qty: 14 | Refills: 0 | Status: ACTIVE | COMMUNITY
Start: 2022-09-29 | End: 1900-01-01

## 2022-09-29 NOTE — PHYSICAL EXAM
[de-identified] : Lumbar Spine Exam:\par \par Inspection:\par erythema (-)\par ecchymosis (-)\par \par Palpation:\par Midline lumbar tenderness:             (-)\par midline thoracic tenderness:          (+)\par paraspinal tenderness:                  L (-) ; R (-)\par thoracic paraspinal tenderness:    L (+) ; R (+)\par \par \par ROM:  \par significant pain with with any movement to midline of thoracic spine\par \par Strength:\par 5/5 throughout all muscle groups of the lower extremity\par \par Special Tests:\par SLR:                           R (-) ; L (-)\par Facet loading:            R (-) ; L (-)\par \par Gait:\par non- antalgic gait\par \par

## 2022-09-29 NOTE — DISCUSSION/SUMMARY
[de-identified] : A discussion regarding available pain management treatment options occurred with the patient.  These included interventional, rehabilitative, pharmacological, and alternative modalities. We will proceed with the following:\par \par Interventional treatment options:\par can consider kyphoplasty\par \par Rehabilitative options:\par deferred for now\par \par Medication based treatment options:\par - initiate trial of mobic 15 mg and to take tylenol 1gram q8hr, tramadol for 2 weeks\par \par avoid bending\par \par f/u for MRI thoracic spine NC review\par

## 2022-09-29 NOTE — HISTORY OF PRESENT ILLNESS
[FreeTextEntry1] : 78 yo F presents with hx of osteoporosis for many years presents with severe midline thoracic pain worse with any slight movements, coughing, breathing without trauma. The pain is 10/10 and completely impedes sleep, ADLs and QOL. The patient denies any weakness, bowel/bladder incontinence. The patient denies numbness, tingling but admits to sharp pain. This started 1 week ago.

## 2022-10-20 ENCOUNTER — APPOINTMENT (OUTPATIENT)
Dept: PAIN MANAGEMENT | Facility: CLINIC | Age: 77
End: 2022-10-20

## 2022-10-20 VITALS
HEART RATE: 87 BPM | BODY MASS INDEX: 24.59 KG/M2 | WEIGHT: 144 LBS | SYSTOLIC BLOOD PRESSURE: 130 MMHG | HEIGHT: 64 IN | DIASTOLIC BLOOD PRESSURE: 74 MMHG

## 2022-10-20 PROCEDURE — 99213 OFFICE O/P EST LOW 20 MIN: CPT

## 2022-10-20 NOTE — DISCUSSION/SUMMARY
[de-identified] : A discussion regarding available pain management treatment options occurred with the patient.  These included interventional, rehabilitative, pharmacological, and alternative modalities. We will proceed with the following:\par \par Interventional treatment options:\par can consider kyphoplasty versus thoracic medial branch block. \par \par Rehabilitative options:\par deferred for now\par \par Medication based treatment options:\par c/w tylenol and mobic for pain relief prn\par \par avoid bending\par \par reviewed MRI thoracic spine NC which showed a subacute T6 compression fracture with edema in the bone. \par

## 2022-10-20 NOTE — HISTORY OF PRESENT ILLNESS
[FreeTextEntry1] : 78 yo F presents with hx of osteoporosis for many years presents with severe midline thoracic pain worse with any slight movements, coughing, breathing without trauma. The pain is 10/10 and completely impedes sleep, ADLs and QOL. The patient denies any weakness, bowel/bladder incontinence. The patient denies numbness, tingling but admits to sharp pain. This started 1 week ago. \par \par interval hx 10/20/22\par Patient has mild improvement after taking NSAIDs and tylenol since the prior visit. The pain is at her thoracic paraspinal muscles and not midline. The pain is more dull and achy compared to sharp, burning, tingling pain. She denies bowel/bladder incontinence, falls, weakness. Pain still occurs with coughing. The pain still impedes with ADLs and QOL.

## 2022-10-20 NOTE — PHYSICAL EXAM
[de-identified] : Lumbar Spine Exam:\par \par Inspection:\par erythema (-)\par ecchymosis (-)\par \par Palpation:\par Midline lumbar tenderness:             (-)\par midline thoracic tenderness:          (-)\par paraspinal tenderness:                  L (-) ; R (-)\par thoracic paraspinal tenderness:    L (+) ; R (+)\par \par Special Tests:\par SLR:                           R (-) ; L (-)\par Facet loading:            R (-) ; L (-)\par \par Gait:\par non- antalgic gait\par \par

## 2022-11-11 ENCOUNTER — APPOINTMENT (OUTPATIENT)
Dept: PAIN MANAGEMENT | Facility: CLINIC | Age: 77
End: 2022-11-11
Payer: MEDICARE

## 2022-11-11 PROCEDURE — 64492 INJ PARAVERT F JNT C/T 3 LEV: CPT | Mod: RT

## 2022-11-11 PROCEDURE — 64491 INJ PARAVERT F JNT C/T 2 LEV: CPT | Mod: RT

## 2022-11-11 PROCEDURE — 64490 INJ PARAVERT F JNT C/T 1 LEV: CPT | Mod: RT

## 2022-11-12 NOTE — PROCEDURE
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  The risks and benefits of the procedure were discussed extensively with the patient.  The consent of the patient was obtained and the following procedure was performed. The patient was placed in the prone position on the fluoroscopy table and the area was prepped and draped in a sterile fashion.  A timeout was performed with all essential staff present and the site and side were verified.\par \par The patient was placed in the prone position with a pillow under the abdomen. The lumbar area was prepped and draped in a sterile fashion.  The thoracic vertebral bodies were identified and the fluoroscope was obliqued ipsilateral to approximately 30 degrees to reveal the appropriate anatomical view.  The junction of the superior articulate process and transverse process at the right T6, T7, T8, T9 levels were identified and marked.  A spinal needle was then introduced and advanced to the above target points at the junction of the SAP and transverse processes until bone was contacted. \par \par Fluoroscope then focused on the sacral ala on AP view, and marked at this point.  A spinal needle was then advanced under fluoroscopic guidance until bone was contacted at the ala.  After negative aspiration for heme and CSF, an injectate of 1 cc 0.25% Marcaine was injected at each of the injection sites.\par \par The needles were subsequently removed.  Vital signs remained normal.  Pulse oximeter was used throughout the procedure and the patient's pulse and oxygen saturation remained within normal limits.  The patient tolerated the procedure well.  There were no complications.  The patient was instructed to apply ice over the injection sites for twenty minutes every two hours for the next 24 to 48 hours.\par \par Disposition:\par \par      1. The patient was advised to F/U in 1-2 weeks to assess the response to the injection. \par      2. They were advised to keep a pain diary to report the results of the diagnostic block at their FUV.\par      3. The patient was also instructed to contact me immediately if there were any concerns related to the procedure performed.\par \par \par

## 2022-11-22 ENCOUNTER — APPOINTMENT (OUTPATIENT)
Dept: PAIN MANAGEMENT | Facility: CLINIC | Age: 77
End: 2022-11-22

## 2022-11-22 VITALS — HEIGHT: 64 IN | BODY MASS INDEX: 24.59 KG/M2 | WEIGHT: 144 LBS

## 2022-11-22 PROCEDURE — 99214 OFFICE O/P EST MOD 30 MIN: CPT

## 2022-11-22 NOTE — DISCUSSION/SUMMARY
[de-identified] : A discussion regarding available pain management treatment options occurred with the patient.  These included interventional, rehabilitative, pharmacological, and alternative modalities. We will proceed with the following:\par \par Interventional treatment options:\par pt had a right T6-9 TMBB and did not notice any relief that day but has 85% to date currently. She only has patient there once a week with certain movements\par \par Rehabilitative options:\par deferred for now\par \par Medication based treatment options:\par c/w tylenol and mobic for pain relief prn\par \par recommended a few PCPs for her to get a new dexa scan and optimize her tx for osteoporosis\par \par avoid bending\par \par \par

## 2022-11-22 NOTE — HISTORY OF PRESENT ILLNESS
[FreeTextEntry1] : 78 yo F presents with hx of osteoporosis for many years presents with severe midline thoracic pain worse with any slight movements, coughing, breathing without trauma. The pain is 10/10 and completely impedes sleep, ADLs and QOL. The patient denies any weakness, bowel/bladder incontinence. The patient denies numbness, tingling but admits to sharp pain. This started 1 week ago. \par \par interval hx 10/20/22\par Patient has mild improvement after taking NSAIDs and tylenol since the prior visit. The pain is at her thoracic paraspinal muscles and not midline. The pain is more dull and achy compared to sharp, burning, tingling pain. She denies bowel/bladder incontinence, falls, weakness. Pain still occurs with coughing. The pain still impedes with ADLs and QOL. \par \par interval hx 11/22/2022\par Pt has significant relief to date after her tight TMBB T6-9 to her thoracic right sided midback dull/achy pain with standing and sitting. The pain only occurs once a week which is mild in nature and no longer sharp. She denies bowel/bladder incontinence, weakness, falls, nighttime pain. The patient has to find a new PCP to assess her osteoporosis. She denies numbness, tingling, burning pain.

## 2022-11-22 NOTE — PHYSICAL EXAM
[de-identified] : Lumbar Spine Exam:\par Palpation:\par Midline lumbar tenderness:             (-)\par midline thoracic tenderness:          (-)\par paraspinal tenderness:                  L (-) ; R (-)\par thoracic paraspinal tenderness:    L (+) ; R (+)\par \par \par \par

## 2022-12-30 ENCOUNTER — APPOINTMENT (OUTPATIENT)
Dept: PAIN MANAGEMENT | Facility: CLINIC | Age: 77
End: 2022-12-30
Payer: MEDICARE

## 2022-12-30 VITALS — WEIGHT: 144 LBS | HEIGHT: 64 IN | BODY MASS INDEX: 24.59 KG/M2

## 2022-12-30 DIAGNOSIS — M54.9 DORSALGIA, UNSPECIFIED: ICD-10-CM

## 2022-12-30 PROCEDURE — 99214 OFFICE O/P EST MOD 30 MIN: CPT

## 2022-12-30 RX ORDER — TRAMADOL HYDROCHLORIDE 50 MG/1
50 TABLET, COATED ORAL
Qty: 42 | Refills: 0 | Status: ACTIVE | COMMUNITY
Start: 2022-12-30 | End: 1900-01-01

## 2023-01-02 PROBLEM — M54.9 BACK PAIN, ACUTE: Status: ACTIVE | Noted: 2022-09-29

## 2023-01-02 NOTE — DISCUSSION/SUMMARY
[de-identified] : A discussion regarding available pain management treatment options occurred with the patient.  These included interventional, rehabilitative, pharmacological, and alternative modalities. We will proceed with the following:\par \par Interventional treatment options:\par pt likely needed a kyphoplasty due to compression fx\par \par Rehabilitative options:\par deferred for now\par \par The patient has been having persistent thoracic pain. At this point we decided to proceed with an MRI of the thoracic spine without contrast to evaluate the pathology and give the patient treatment options to help with the pain. Potential treatment options such as further conservative therapy, injections, and surgery were also briefly discussed. The medications listed below were also prescribed today. The risks and benefits of these medications were also discussed as well as the manner in which they should be taken. The patient will follow up after the MRI.\par \par \par Medication based treatment options:\par c/w tylenol and mobic for pain relief prn\par tramadol 50 TID prn ordered for severe midback pain from likely a compression fx\par \par recommended a few PCPs for her to get a new dexa scan and optimize her tx for osteoporosis\par \par avoid bending\par \par \par

## 2023-01-02 NOTE — PHYSICAL EXAM
[de-identified] : Lumbar Spine Exam:\par Palpation:\par Midline lumbar tenderness:             (-)\par midline thoracic tenderness:          (-)\par paraspinal tenderness:                  L (-) ; R (-)\par thoracic paraspinal tenderness:    L (+) ; R (+)\par \par \par \par

## 2023-01-02 NOTE — HISTORY OF PRESENT ILLNESS
[FreeTextEntry1] : 78 yo F presents with hx of osteoporosis for many years presents with severe midline thoracic pain worse with any slight movements, coughing, breathing without trauma. The pain is 10/10 and completely impedes sleep, ADLs and QOL. The patient denies any weakness, bowel/bladder incontinence. The patient denies numbness, tingling but admits to sharp pain. This started 1 week ago. \par \par interval hx 10/20/22\par Patient has mild improvement after taking NSAIDs and tylenol since the prior visit. The pain is at her thoracic paraspinal muscles and not midline. The pain is more dull and achy compared to sharp, burning, tingling pain. She denies bowel/bladder incontinence, falls, weakness. Pain still occurs with coughing. The pain still impedes with ADLs and QOL. \par \par interval hx 11/22/2022\par Pt has significant relief to date after her tight TMBB T6-9 to her thoracic right sided midback dull/achy pain with standing and sitting. The pain only occurs once a week which is mild in nature and no longer sharp. She denies bowel/bladder incontinence, weakness, falls, nighttime pain. The patient has to find a new PCP to assess her osteoporosis. She denies numbness, tingling, burning pain.\par \par interval hx 12/30/22\par Pt presents with new severe thoracic midback pain that is sharp, 10/10 pain worse with any movements and breathing. She denies bowel/bladder incontinence, weakness, falls, numbness, tingling, radicular pain. She said she sneezed and the pain started. She does not know if she has osteoporosis. Pain is improved if she does not move. The pain started a few days ago.

## 2023-01-11 ENCOUNTER — APPOINTMENT (OUTPATIENT)
Dept: PAIN MANAGEMENT | Facility: CLINIC | Age: 78
End: 2023-01-11
Payer: MEDICARE

## 2023-01-11 PROCEDURE — 64490 INJ PARAVERT F JNT C/T 1 LEV: CPT | Mod: LT

## 2023-01-11 PROCEDURE — 64491 INJ PARAVERT F JNT C/T 2 LEV: CPT | Mod: LT

## 2023-01-11 PROCEDURE — 64492 INJ PARAVERT F JNT C/T 3 LEV: CPT | Mod: LT

## 2023-01-12 NOTE — PROCEDURE
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  The risks and benefits of the procedure were discussed extensively with the patient.  The consent of the patient was obtained and the following procedure was performed. The patient was placed in the prone position on the fluoroscopy table and the area was prepped and draped in a sterile fashion.  A timeout was performed with all essential staff present and the site and side were verified.\par \par The patient was placed in the prone position with a pillow under the abdomen to minimize the lumbar lordosis.  The lumbar area was prepped and draped in a sterile fashion.  The thoracic vertebral bodies were identified and the fluoroscope was obliqued ipsilateral to approximately 10 degrees to reveal the appropriate anatomical view.  The junction of the superior articulate process and transverse process at the left T5, T6, T7, T8 were identified and marked.    A spinal needle was then introduced and advanced to the above target points at the junction of the SAP and transverse processes until bone was contacted. \par   After negative aspiration for heme and CSF, an injectate of 1 cc 0.25% Marcaine was injected at each of the injection sites.\par \par The needles were subsequently removed.  Vital signs remained normal.  Pulse oximeter was used throughout the procedure and the patient's pulse and oxygen saturation remained within normal limits.  The patient tolerated the procedure well.  There were no complications.  The patient was instructed to apply ice over the injection sites for twenty minutes every two hours for the next 24 to 48 hours.\par \par Disposition:\par \par      1. The patient was advised to F/U in 1-2 weeks to assess the response to the injection. \par      2. They were advised to keep a pain diary to report the results of the diagnostic block at their FUV.\par      3. The patient was also instructed to contact me immediately if there were any concerns related to the procedure performed.\par \par

## 2023-01-13 ENCOUNTER — APPOINTMENT (OUTPATIENT)
Dept: NEUROSURGERY | Facility: CLINIC | Age: 78
End: 2023-01-13
Payer: MEDICARE

## 2023-01-13 VITALS — BODY MASS INDEX: 25.61 KG/M2 | WEIGHT: 150 LBS | HEIGHT: 64 IN

## 2023-01-13 DIAGNOSIS — Z87.09 PERSONAL HISTORY OF OTHER DISEASES OF THE RESPIRATORY SYSTEM: ICD-10-CM

## 2023-01-13 DIAGNOSIS — Z87.39 PERSONAL HISTORY OF OTHER DISEASES OF THE MUSCULOSKELETAL SYSTEM AND CONNECTIVE TISSUE: ICD-10-CM

## 2023-01-13 DIAGNOSIS — Z82.61 FAMILY HISTORY OF ARTHRITIS: ICD-10-CM

## 2023-01-13 DIAGNOSIS — Z63.5 DISRUPTION OF FAMILY BY SEPARATION AND DIVORCE: ICD-10-CM

## 2023-01-13 DIAGNOSIS — Z80.9 FAMILY HISTORY OF MALIGNANT NEOPLASM, UNSPECIFIED: ICD-10-CM

## 2023-01-13 DIAGNOSIS — Z82.3 FAMILY HISTORY OF STROKE: ICD-10-CM

## 2023-01-13 DIAGNOSIS — F17.200 NICOTINE DEPENDENCE, UNSPECIFIED, UNCOMPLICATED: ICD-10-CM

## 2023-01-13 PROCEDURE — 99204 OFFICE O/P NEW MOD 45 MIN: CPT

## 2023-01-13 SDOH — SOCIAL STABILITY - SOCIAL INSECURITY: DISRUPTION OF FAMILY BY SEPARATION AND DIVORCE: Z63.5

## 2023-01-13 NOTE — ASSESSMENT
[FreeTextEntry1] : This is a 78-year-old female who presents for neurosurgical consultation with regards to thoracic compression deformities at T6 and T8.  She has responded well to interventional treatments in the form of median branch block and we will continue to monitor her response moving forward.  She may be a candidate for radiofrequency ablation of the site versus a thoracic epidural steroid injection moving forward.  With regards to future compression deformities, she is to undergo primary care input with regards to medical management of osteoporosis.\par \par We have discussed at length the option for kyphoplasty, she will consider this as a form of management if her pain were to intensify and will be in close contact with our office. Risks/benefits of procedure outlined and she expresses understanding. \par \par Paris Vela PA-C\par César Montez MD

## 2023-01-13 NOTE — HISTORY OF PRESENT ILLNESS
[de-identified] : This is a 78-year-old female who presents for neurosurgical consultation with regards to thoracic pain in the setting of thoracic compression deformities at T6 and T8.  As a review, she began to experience mid back pain in September 2022.  She underwent an x-ray with MRI of the thoracic region which determined an acute T6 compression deformity.  She remains under the care of Dr. Elmore of pain management and underwent a median branch block which provided excellent relief for a short period of time.  In December 2022 she reports an acute exacerbation of mid-back pain once again for which she returned to Pain Management for additional insight. Updated MR T spine revealed an acute T8 compression deformity along with the chronic deformity at T6. A repeat median branch block was completed on Tuesday (1/10/2023) with a reduction in pain appreciated once again with % improvement.  Currently 4/10 thoracic back pain.\par \par She presents at this time to discuss the option of neurosurgical intervention with regards to her compression deformities. I have discussed at length that the possibility for a kyphoplasty would aid with her current fractures but would not prevent future fractures from occurring.  She is aware that she must undergo primary care consultation with regards to medical management of osteoporosis. She notes that she will be reaching out to Dr. Landeros for an appointment.\par \par Patient denies bladder/bowel dysfunction, weakness in upper/lower extremities, frequent falls. (+) cigarette use (1ppdx 20+yrs)\par \par MR T Spine 9/29/2022- subacute T6 compression deformity\par MR T Spine 1/6/2023- chronic T6 compression deformity with acute T8 compression deformity; both fractures are severe with 80% or more compression appreciated.\par \par PHYSICAL EXAM: \par \par Constitutional: Well appearing, no distress. Kyphotic posturing noted.\par HEENT: Normocephalic Atraumatic\par Psychiatric: Alert and oriented to all spheres, normal mood\par Pulmonary: No respiratory distress\par \par Neurologic: \par CN II-XII grossly intact\par Palpation: minimal discomfort to palpation over inferior thoracic region\par Strength: Full strength in all major muscle groups\par Sensation: Full sensation to light touch in all extremities\par Reflexes: \par  2+ patellar\par  2+ ankle jerk\par \par ROM limitation with fwd flex/ext\par \par Signs:\par SLR negative\par \par Gait: steady, walking w/o assistance.\par

## 2023-01-13 NOTE — END OF VISIT
[FreeTextEntry3] : I have independently evaluated and examined this patient, as well as independently reviewed her imaging and agree with the ACP assessment & plan above.  Ms. Hardy is a very pleasant 78-year-old woman with T6 and T8 compression fractures improved with medial branch block.  She will contact our office if her pain intensifies and we will proceed with T6 and T8 biopsy and kyphoplasty, given her longstanding smoking history and possibility of pathologic fractures.  In the meantime she will establish care with a primary care physician and undergo medical optimization.  All her questions were answered, she was in agreement with the plan above and above discussed with Dr. Elmore as well.\par  [Time Spent: ___ minutes] : I have spent [unfilled] minutes of time on the encounter.

## 2023-01-24 ENCOUNTER — APPOINTMENT (OUTPATIENT)
Dept: PAIN MANAGEMENT | Facility: CLINIC | Age: 78
End: 2023-01-24
Payer: MEDICARE

## 2023-01-24 VITALS — BODY MASS INDEX: 25.61 KG/M2 | WEIGHT: 150 LBS | HEIGHT: 64 IN

## 2023-01-24 DIAGNOSIS — S22.000A WEDGE COMPRESSION FRACTURE OF UNSPECIFIED THORACIC VERTEBRA, INITIAL ENCOUNTER FOR CLOSED FRACTURE: ICD-10-CM

## 2023-01-24 DIAGNOSIS — M47.814 SPONDYLOSIS W/OUT MYELOPATHY OR RADICULOPATHY, THORACIC REGION: ICD-10-CM

## 2023-01-24 PROCEDURE — 99213 OFFICE O/P EST LOW 20 MIN: CPT

## 2023-01-24 NOTE — DISCUSSION/SUMMARY
[de-identified] : A discussion regarding available pain management treatment options occurred with the patient.  These included interventional, rehabilitative, pharmacological, and alternative modalities. We will proceed with the following:\par \par Interventional treatment options:\par pt had significant relief after the thoracic medial branch block to date. no need for kyphoplasty\par \par Rehabilitative options:\par deferred for now\par \par Medication based treatment options:\par c/w tylenol \par \par recommended a few PCPs for her to get a new dexa scan and optimize her tx for osteoporosis\par \par avoid bending\par \par \par

## 2023-01-24 NOTE — PHYSICAL EXAM
[de-identified] : Lumbar Spine Exam:\par Palpation:\par Midline lumbar tenderness:             (-)\par midline thoracic tenderness:          (-)\par paraspinal tenderness:                  L (-) ; R (-)\par thoracic paraspinal tenderness:    L (+) ; R (+)\par \par

## 2023-01-24 NOTE — HISTORY OF PRESENT ILLNESS
[FreeTextEntry1] : 78 yo F presents with hx of osteoporosis for many years presents with severe midline thoracic pain worse with any slight movements, coughing, breathing without trauma. The pain is 10/10 and completely impedes sleep, ADLs and QOL. The patient denies any weakness, bowel/bladder incontinence. The patient denies numbness, tingling but admits to sharp pain. This started 1 week ago. \par \par interval hx 10/20/22\par Patient has mild improvement after taking NSAIDs and tylenol since the prior visit. The pain is at her thoracic paraspinal muscles and not midline. The pain is more dull and achy compared to sharp, burning, tingling pain. She denies bowel/bladder incontinence, falls, weakness. Pain still occurs with coughing. The pain still impedes with ADLs and QOL. \par \par interval hx 11/22/2022\par Pt has significant relief to date after her tight TMBB T6-9 to her thoracic right sided midback dull/achy pain with standing and sitting. The pain only occurs once a week which is mild in nature and no longer sharp. She denies bowel/bladder incontinence, weakness, falls, nighttime pain. The patient has to find a new PCP to assess her osteoporosis. She denies numbness, tingling, burning pain.\par \par interval hx 12/30/22\par Pt presents with new severe thoracic midback pain that is sharp, 10/10 pain worse with any movements and breathing. She denies bowel/bladder incontinence, weakness, falls, numbness, tingling, radicular pain. She said she sneezed and the pain started. She does not know if she has osteoporosis. Pain is improved if she does not move. The pain started a few days ago.  \par \par interval hx 1/24/23\par Pt had 70% relief to date after the Thoracic medial branch block  and 100% relief for 2 days of the midback pain. She denies bowel/bladder incontinence, weakness, falls. She states she will follow-up with a PCP for bone strength optimization and her pulmonologist for her COPD. The back pain is a 3/10, dull achy in nature and less frequent. \par

## 2024-03-12 NOTE — H&P ADULT - NSCORESITESY/N_GEN_A_CORE_RD
[FreeTextEntry1] : Pt is a 53-year-old who presents today for well woman exam.  Going through divorce. [employer].  Mammo: 7/2023 Colonoscopy:  No

## 2024-08-29 ENCOUNTER — APPOINTMENT (OUTPATIENT)
Dept: PAIN MANAGEMENT | Facility: CLINIC | Age: 79
End: 2024-08-29

## 2024-08-29 DIAGNOSIS — M75.50 BURSITIS OF UNSPECIFIED SHOULDER: ICD-10-CM

## 2024-08-29 DIAGNOSIS — M25.519 PAIN IN UNSPECIFIED SHOULDER: ICD-10-CM

## 2024-08-29 PROCEDURE — 99213 OFFICE O/P EST LOW 20 MIN: CPT

## 2024-08-29 RX ORDER — MELOXICAM 15 MG/1
15 TABLET ORAL DAILY
Qty: 30 | Refills: 0 | Status: ACTIVE | COMMUNITY
Start: 2024-08-29 | End: 1900-01-01

## 2024-08-29 NOTE — DISCUSSION/SUMMARY
[de-identified] : A discussion regarding available pain management treatment options occurred with the patient.  These included interventional, rehabilitative, pharmacological, and alternative modalities. We will proceed with the following:  Interventional treatment options: 1. X-ray of the right shoulder ordered due to pain and decrease in range of motion in that area to delineate a pain generator.  Rehabilitative options: -Participation in active HEP was discussed and printed.  Medication based treatment options: - ordered Meloxicam 15mg daily for 4 weeks. Discussed risks and benefits. Avoid taking for any side effects. -Patient is aware to take for 2 weeks straight than take 1 week off before repeating.  Follow up after imaging studies for further recommendations.  I, Rafael Cruz, attest that this documentation has been prepared under the direction and in the presence of Provider Antonino Elmore, DO The documentation recorded by the scribe, in my presence, accurately reflects the service I personally performed, and the decisions made by me with my edits as appropriate.   Best Regards, Antonino Elmore D.O.

## 2024-09-23 ENCOUNTER — APPOINTMENT (OUTPATIENT)
Dept: PAIN MANAGEMENT | Facility: CLINIC | Age: 79
End: 2024-09-23
Payer: MEDICARE

## 2024-09-23 DIAGNOSIS — M25.519 PAIN IN UNSPECIFIED SHOULDER: ICD-10-CM

## 2024-09-23 DIAGNOSIS — M75.50 BURSITIS OF UNSPECIFIED SHOULDER: ICD-10-CM

## 2024-09-23 PROCEDURE — 99213 OFFICE O/P EST LOW 20 MIN: CPT | Mod: 25

## 2024-09-23 PROCEDURE — 20610 DRAIN/INJ JOINT/BURSA W/O US: CPT | Mod: RT

## 2024-09-23 NOTE — DISCUSSION/SUMMARY
[de-identified] : A discussion regarding available pain management treatment options occurred with the patient.  These included interventional, rehabilitative, pharmacological, and alternative modalities. We will proceed with the following:  Interventional treatment options: 1. Opted to perform a right shoulder steroid injection in office today. Risks with the procedure such as bleeding and infection was discussed in detail.  Rehabilitative options: - Physical therapy of the right shoulder 2-3 times a week for 4-8 weeks stressing a home exercise program of walking, shoulder griddle strengthening, swimming, elliptical , recumbent bike, Earl chi and Yoga. Use things that heat like hot shower or icy heat before rehab, exercising and at the beginning of the day, and ice (ice in a bag never directly on the skin) after activity and at the end of the day. -Participation in active HEP was discussed and printed.  Medication based treatment options: - renewed Meloxicam 15mg daily for 4 weeks. Discussed risks and benefits. Avoid taking for any side effects. -Patient is aware to take for 2 weeks straight than take 1 week off before repeating.  Follow up in 6 weeks for reassessment.  I, Rafael Cruz, attest that this documentation has been prepared under the direction and in the presence of Provider Antonino Elmore, DO The documentation recorded by the scribe, in my presence, accurately reflects the service I personally performed, and the decisions made by me with my edits as appropriate.   Best Regards, Antonino Elmore D.O.

## 2024-09-23 NOTE — PROCEDURE
[Large Joint Injection] : Large joint injection [Right] : of the right [Shoulder] : shoulder [Pain] : pain [Alcohol] : alcohol [___ cc    0.25%] : Bupivacaine (Marcaine) ~Vcc of 0.25%  [___ cc    4mg] : Dexamethasone (Decadron) ~Vcc of 4 mg  [Call if redness, pain or fever occur] : call if redness, pain or fever occur [Apply ice for 15min out of every hour for the next 12-24 hours as tolerated] : apply ice for 15 minutes out of every hour for the next 12-24 hours as tolerated [Previous OTC use and PT nontherapeutic] : patient has tried OTC's including aspirin, Ibuprofen, Aleve, etc or prescription NSAIDS, and/or exercises at home and/or physical therapy without satisfactory response [Patient had decreased mobility in the joint] : patient had decreased mobility in the joint [Risks, benefits, alternatives discussed / Verbal consent obtained] : the risks benefits, and alternatives have been discussed, and verbal consent was obtained

## 2024-09-23 NOTE — HISTORY OF PRESENT ILLNESS
[FreeTextEntry1] : 76 yo F presents with hx of osteoporosis for many years presents with severe midline thoracic pain worse with any slight movements, coughing, breathing without trauma. The pain is 10/10 and completely impedes sleep, ADLs and QOL. The patient denies any weakness, bowel/bladder incontinence. The patient denies numbness, tingling but admits to sharp pain. This started 1 week ago.   PRESENTING TODAY 09-: Patient presents to the office today for a follow up visit with continued upper back pain and right shoulder pain. Patient admits to right dull, achy pain with overhead activity without associated numbness, tingling or radicular pain past the elbow for (6 weeks). Patient also has pain with laying on her affected side. Pain is 8/10 in intensity. She has tried NSAIDs, compound potions and HEP without significant relief. Patient denies any bowel or bladder dysfunction, incontinence, or saddle anesthesia.

## 2024-10-22 ENCOUNTER — RX RENEWAL (OUTPATIENT)
Age: 79
End: 2024-10-22

## 2024-11-11 ENCOUNTER — APPOINTMENT (OUTPATIENT)
Dept: PAIN MANAGEMENT | Facility: CLINIC | Age: 79
End: 2024-11-11
Payer: MEDICARE

## 2024-11-11 DIAGNOSIS — M25.519 PAIN IN UNSPECIFIED SHOULDER: ICD-10-CM

## 2024-11-11 DIAGNOSIS — M75.50 BURSITIS OF UNSPECIFIED SHOULDER: ICD-10-CM

## 2024-11-11 PROCEDURE — 99214 OFFICE O/P EST MOD 30 MIN: CPT

## 2024-11-11 RX ORDER — GABAPENTIN 300 MG/1
300 CAPSULE ORAL
Qty: 30 | Refills: 1 | Status: ACTIVE | COMMUNITY
Start: 2024-11-11 | End: 1900-01-01

## 2024-11-11 RX ORDER — DICLOFENAC SODIUM 75 MG/1
75 TABLET, DELAYED RELEASE ORAL
Qty: 60 | Refills: 0 | Status: ACTIVE | COMMUNITY
Start: 2024-11-11 | End: 1900-01-01

## 2024-11-19 ENCOUNTER — RX RENEWAL (OUTPATIENT)
Age: 79
End: 2024-11-19

## 2024-12-16 ENCOUNTER — RX RENEWAL (OUTPATIENT)
Age: 79
End: 2024-12-16

## 2024-12-23 ENCOUNTER — APPOINTMENT (OUTPATIENT)
Dept: PAIN MANAGEMENT | Facility: CLINIC | Age: 79
End: 2024-12-23

## 2025-01-17 ENCOUNTER — RX RENEWAL (OUTPATIENT)
Age: 80
End: 2025-01-17

## 2025-02-17 NOTE — ED ADULT TRIAGE NOTE - RESPIRATORY RATE (BREATHS/MIN)
Interventional Radiology Brief Postprocedure Note    Samy Elena     Attending: SOPHIA Seals/ Goyo Nuñez M.D.     Assistant: n/a    Diagnosis: symptomatic pleural effusion    Description of procedure: US guided left thora    Contrast: none     Anesthesia:  Local (Lidocaine)    Volume of Lidocaine Utilized (ml): 10ml      Medications: none     Complications: None      Estimated Blood Loss: Estimated Blood Loss: None    Anticoagulation: n/a    Specimens: 900 ml clear yellow fluid    Findings: Successful US Guided left thoracentesis. 900 ml removed and sent to lab. Large effusion remains. CXR pending.    2/17/2025 10:11 AM     18

## 2025-02-27 ENCOUNTER — APPOINTMENT (OUTPATIENT)
Dept: PAIN MANAGEMENT | Facility: CLINIC | Age: 80
End: 2025-02-27

## 2025-02-28 ENCOUNTER — APPOINTMENT (OUTPATIENT)
Dept: PAIN MANAGEMENT | Facility: CLINIC | Age: 80
End: 2025-02-28

## 2025-02-28 DIAGNOSIS — M47.814 SPONDYLOSIS W/OUT MYELOPATHY OR RADICULOPATHY, THORACIC REGION: ICD-10-CM

## 2025-02-28 DIAGNOSIS — M54.9 DORSALGIA, UNSPECIFIED: ICD-10-CM

## 2025-02-28 PROCEDURE — 99214 OFFICE O/P EST MOD 30 MIN: CPT

## 2025-03-28 ENCOUNTER — LABORATORY RESULT (OUTPATIENT)
Age: 80
End: 2025-03-28

## 2025-03-28 ENCOUNTER — APPOINTMENT (OUTPATIENT)
Dept: PAIN MANAGEMENT | Facility: CLINIC | Age: 80
End: 2025-03-28

## 2025-03-28 DIAGNOSIS — M54.9 DORSALGIA, UNSPECIFIED: ICD-10-CM

## 2025-03-28 DIAGNOSIS — M47.814 SPONDYLOSIS W/OUT MYELOPATHY OR RADICULOPATHY, THORACIC REGION: ICD-10-CM

## 2025-03-28 DIAGNOSIS — S22.000A WEDGE COMPRESSION FRACTURE OF UNSPECIFIED THORACIC VERTEBRA, INITIAL ENCOUNTER FOR CLOSED FRACTURE: ICD-10-CM

## 2025-03-28 PROCEDURE — 99214 OFFICE O/P EST MOD 30 MIN: CPT

## 2025-04-04 LAB
PM 6 MAM: NEGATIVE NG/ML
PM 7-AMINO-CLONAZ: NEGATIVE NG/ML
PM ALPHA-HYDROXY-ALPRAZOLAM: NEGATIVE NG/ML
PM ALPHA-HYDROXY-MIDAZOLAM: NEGATIVE NG/ML
PM ALPRAZOLAM: NEGATIVE NG/ML
PM AMOBARBITAL: NEGATIVE NG/ML
PM AMPHETAMINE INTERP: NEGATIVE
PM AMPHETAMINE: NEGATIVE NG/ML
PM BARBURATES INTERP: NEGATIVE
PM BEG: NEGATIVE NG/ML
PM BENZODIAZEPINES INTERP: NEGATIVE
PM BUPRENORPHINE INTERP: NEGATIVE
PM BUPRENORPHINE: NEGATIVE NG/ML
PM BUTALBITAL: NEGATIVE NG/ML
PM CLONAZEPAM: NEGATIVE NG/ML
PM COCAINE INTERP: NEGATIVE
PM COCAINE: NEGATIVE NG/ML
PM CODIENE: NEGATIVE NG/ML
PM COTININE: >1000 NG/ML
PM DIAZEPAM: NEGATIVE NG/ML
PM DIHYROCODEINE: NEGATIVE NG/ML
PM EDDP: NEGATIVE NG/ML
PM FENTANYL INTERP: NEGATIVE
PM FENTANYL: NEGATIVE NG/ML
PM FLUNITRAZEPAM: NEGATIVE NG/ML
PM FLURAZEPAM: NEGATIVE NG/ML
PM HYDROCODONE: NEGATIVE NG/ML
PM HYDROMORPHONE: NEGATIVE NG/ML
PM LORAZEPAM: NEGATIVE NG/ML
PM MARIJUANA (DELTA-9-THC): NEGATIVE NG/ML
PM MARIJUANA INTERP: NEGATIVE
PM MDA: NEGATIVE NG/ML
PM MDEA: NEGATIVE NG/ML
PM MDMA: NEGATIVE NG/ML
PM MEPERIDINE: NEGATIVE NG/ML
PM METHADONE INTERP: NEGATIVE
PM METHADONE: NEGATIVE NG/ML
PM METHAMPHETAMINE: NEGATIVE NG/ML
PM MIDAZOLAM: NEGATIVE NG/ML
PM MORPHINE: NEGATIVE NG/ML
PM NALOXONE: NEGATIVE NG/ML
PM NALTREXONE: NEGATIVE NG/ML
PM NICOTINE INTERP: POSITIVE
PM NORBUPRENORPHINE: NEGATIVE NG/ML
PM NORDIAZEPAM: NEGATIVE NG/ML
PM NORFENTANYL: NEGATIVE NG/ML
PM NORMEPERIDINE: NEGATIVE NG/ML
PM NOROXYCODONE: NEGATIVE NG/ML
PM OPIOID INTERP: NEGATIVE
PM OXAZEPAM: NEGATIVE NG/ML
PM OXXYCODONE INTERP: NEGATIVE
PM OXYCODONE: NEGATIVE NG/ML
PM OXYMORPHONE: NEGATIVE NG/ML
PM PCP: NEGATIVE NG/ML
PM PHENCYCLIDINE INTERP: NEGATIVE
PM PHENOBARBITAL: NEGATIVE NG/ML
PM PPX: NEGATIVE NG/ML
PM PROPOXYPHENE INTERP: NEGATIVE
PM SECOBARBITAL: NEGATIVE NG/ML
PM SUFENTANIL: NEGATIVE NG/ML
PM TAPENTADOL: NEGATIVE NG/ML
PM TEMAZEPAM: NEGATIVE NG/ML
PM TRAMADOL INTERP: NEGATIVE
PM TRAMADOL: NEGATIVE NG/ML

## 2025-04-28 ENCOUNTER — APPOINTMENT (OUTPATIENT)
Dept: PAIN MANAGEMENT | Facility: CLINIC | Age: 80
End: 2025-04-28
Payer: MEDICARE

## 2025-04-28 DIAGNOSIS — M47.814 SPONDYLOSIS W/OUT MYELOPATHY OR RADICULOPATHY, THORACIC REGION: ICD-10-CM

## 2025-04-28 DIAGNOSIS — M79.18 MYALGIA, OTHER SITE: ICD-10-CM

## 2025-04-28 PROCEDURE — 99214 OFFICE O/P EST MOD 30 MIN: CPT

## 2025-05-23 ENCOUNTER — RX RENEWAL (OUTPATIENT)
Age: 80
End: 2025-05-23

## 2025-06-20 ENCOUNTER — APPOINTMENT (OUTPATIENT)
Dept: PAIN MANAGEMENT | Facility: CLINIC | Age: 80
End: 2025-06-20

## 2025-06-20 PROBLEM — M54.50 LOW BACK PAIN: Status: ACTIVE | Noted: 2025-06-20

## 2025-06-20 PROBLEM — M79.18 MYOFASCIAL PAIN SYNDROME OF LUMBAR SPINE: Status: ACTIVE | Noted: 2025-06-20

## 2025-06-20 PROCEDURE — 20552 NJX 1/MLT TRIGGER POINT 1/2: CPT

## 2025-06-20 PROCEDURE — 99214 OFFICE O/P EST MOD 30 MIN: CPT | Mod: 25

## 2025-06-23 ENCOUNTER — RX RENEWAL (OUTPATIENT)
Age: 80
End: 2025-06-23

## 2025-06-23 ENCOUNTER — APPOINTMENT (OUTPATIENT)
Dept: PAIN MANAGEMENT | Facility: CLINIC | Age: 80
End: 2025-06-23

## 2025-07-23 ENCOUNTER — RX RENEWAL (OUTPATIENT)
Age: 80
End: 2025-07-23

## 2025-08-07 ENCOUNTER — APPOINTMENT (OUTPATIENT)
Dept: PAIN MANAGEMENT | Facility: CLINIC | Age: 80
End: 2025-08-07